# Patient Record
Sex: MALE | Race: BLACK OR AFRICAN AMERICAN | Employment: UNEMPLOYED | URBAN - METROPOLITAN AREA
[De-identification: names, ages, dates, MRNs, and addresses within clinical notes are randomized per-mention and may not be internally consistent; named-entity substitution may affect disease eponyms.]

---

## 2017-08-03 ENCOUNTER — HOSPITAL ENCOUNTER (EMERGENCY)
Age: 42
Discharge: HOME | End: 2017-08-03
Attending: EMERGENCY MEDICINE | Admitting: EMERGENCY MEDICINE
Payer: SELF-PAY

## 2017-08-03 VITALS
WEIGHT: 145 LBS | DIASTOLIC BLOOD PRESSURE: 87 MMHG | BODY MASS INDEX: 21.48 KG/M2 | TEMPERATURE: 98.2 F | OXYGEN SATURATION: 99 % | RESPIRATION RATE: 16 BRPM | SYSTOLIC BLOOD PRESSURE: 136 MMHG | HEIGHT: 69 IN | HEART RATE: 65 BPM

## 2017-08-03 DIAGNOSIS — K08.89 DENTALGIA: Primary | ICD-10-CM

## 2017-08-03 PROCEDURE — 74011000250 HC RX REV CODE- 250: Performed by: PHYSICIAN ASSISTANT

## 2017-08-03 PROCEDURE — 99283 EMERGENCY DEPT VISIT LOW MDM: CPT

## 2017-08-03 PROCEDURE — 74011250637 HC RX REV CODE- 250/637: Performed by: PHYSICIAN ASSISTANT

## 2017-08-03 RX ORDER — NAPROXEN 500 MG/1
500 TABLET ORAL
Qty: 20 TAB | Refills: 0 | Status: SHIPPED | OUTPATIENT
Start: 2017-08-03

## 2017-08-03 RX ORDER — TRAMADOL HYDROCHLORIDE 50 MG/1
50 TABLET ORAL
Status: COMPLETED | OUTPATIENT
Start: 2017-08-03 | End: 2017-08-03

## 2017-08-03 RX ORDER — PENICILLIN V POTASSIUM 500 MG/1
500 TABLET, FILM COATED ORAL 4 TIMES DAILY
Qty: 28 TAB | Refills: 0 | Status: SHIPPED | OUTPATIENT
Start: 2017-08-03 | End: 2017-08-10

## 2017-08-03 RX ADMIN — LIDOCAINE HYDROCHLORIDE: 20 SOLUTION ORAL; TOPICAL at 09:22

## 2017-08-03 RX ADMIN — TRAMADOL HYDROCHLORIDE 50 MG: 50 TABLET, COATED ORAL at 09:22

## 2017-08-03 NOTE — DISCHARGE INSTRUCTIONS
Periodontal Conditions: Care Instructions  Your Care Instructions    Periodontal conditions affect the gums, bone, and tissue that surround and support the teeth. The most common problems are caused by plaque. Plaque is a thin film of bacteria that sticks to teeth above and below the gum line. It can build up and harden into tartar. The bacteria in plaque and tartar can cause gum disease. Gingivitis is a disease that affects the gums (gingiva). The gums are the soft tissue that surrounds the teeth. Gingivitis causes red, swollen, tender gums that bleed easily when brushed, persistent bad breath, and sensitive teeth. Because it is not painful, many people do not get treatment when they should. Gingivitis can be reversed with good dental care. Periodontitis is a more advanced disease that affects more than the gums. The gums pull away from the teeth. This leaves deep pockets where bacteria can grow. The disease can damage the bones that support the teeth. The teeth may get loose and fall out. A periodontal condition should be treated as soon as it is found. Finding gum problems early, treating them right away, and having regular checkups bring the best results. You can treat mild periodontal conditions by brushing and flossing your teeth every day. Your dentist may prescribe a mouthwash to kill the bacteria that can damage teeth and gums. Your dentist may have you take antibiotics to treat infection from moderate periodontal disease. If your gums have pulled away from your teeth, you may need cleaning between the teeth and gums right down to the teeth roots. This is called root planing and scaling. If you have severe periodontal disease, you may need surgery to remove diseased gum tissue or repair bone damage. Follow-up care is a key part of your treatment and safety. Be sure to make and go to all appointments, and call your dentist if you are having problems.  It's also a good idea to know your test results and keep a list of the medicines you take. How can you care for yourself at home? · If your dentist prescribed antibiotics, take them as directed. Do not stop taking them just because you feel better. You need to take the full course of antibiotics. · Brush your teeth twice a day, in the morning and at night. ¨ Use a toothbrush with soft, rounded-end bristles and a head that is small enough to reach all parts of your teeth and mouth. Replace your toothbrush every 3 to 4 months. ¨ Use a fluoride toothpaste. ¨ Place the brush at a 45-degree angle where the teeth meet the gums. Press firmly, and gently rock the brush back and forth using small circular movements. ¨ Brush chewing surfaces vigorously with short back-and-forth strokes. ¨ Brush your tongue from back to front. · Floss at least once a day. Choose the type and flavor that you like best.  · Have your teeth cleaned by a professional at least twice a year. · Ask your dentist about using an antibacterial mouthwash to help reduce bacteria. · Rinse your mouth with water or chew sugar-free gum after meals if you can't brush your teeth. · Do not smoke or use smokeless tobacco. Tobacco use can cause periodontal disease. When should you call for help? Call your dentist now or seek immediate medical care if:  · You have symptoms of infection, such as:  ¨ Increased pain, swelling, warmth, or redness. ¨ Red streaks leading from the area. ¨ Pus draining from the area. ¨ A fever. Watch closely for changes in your health, and be sure to contact your dentist if:  · You have new or worse tooth pain. · You do not get better as expected. Where can you learn more? Go to http://eduardo-abel.info/. Enter H558 in the search box to learn more about \"Periodontal Conditions: Care Instructions. \"  Current as of: January 6, 2017  Content Version: 11.3  © 8731-7392 [employer].  Care instructions adapted under license by [employer] (which disclaims liability or warranty for this information). If you have questions about a medical condition or this instruction, always ask your healthcare professional. [address] any warranty or liability for your use of this information.

## 2017-08-03 NOTE — ED NOTES
Discharge instructions reviewed with patient. Patient able to verbalize events which would require immediate follow up.

## 2017-08-03 NOTE — ED PROVIDER NOTES
Patient is a 40 y.o. male presenting with dental problem. The history is provided by the patient. Dental Pain    This is a recurrent problem. The current episode started 2 days ago. The problem occurs constantly. The problem has not changed since onset. The pain is located in the right lower and right upper mouth. The quality of the pain is aching. The pain is at a severity of 10/10. The pain is moderate. Associated symptoms include vomiting. There was no nausea, no fever, no swelling, no chest pain, no shortness of breath, no headaches, no gum redness and no drainage. He has tried acetaminophen for the symptoms. The treatment provided mild relief. History reviewed. No pertinent past medical history. History reviewed. No pertinent surgical history. History reviewed. No pertinent family history. Social History     Social History    Marital status: SINGLE     Spouse name: N/A    Number of children: N/A    Years of education: N/A     Occupational History    Not on file. Social History Main Topics    Smoking status: Former Smoker    Smokeless tobacco: Never Used    Alcohol use Yes    Drug use: No    Sexual activity: Not on file     Other Topics Concern    Not on file     Social History Narrative         ALLERGIES: Review of patient's allergies indicates no known allergies. Review of Systems   Constitutional: Negative for activity change, appetite change, chills, fatigue and fever. HENT: Positive for dental problem. Negative for congestion, drooling, ear pain, facial swelling, mouth sores, postnasal drip, rhinorrhea and sore throat. Eyes: Negative for pain and discharge. Respiratory: Negative. Negative for apnea, cough and shortness of breath. Cardiovascular: Negative. Negative for chest pain. Gastrointestinal: Negative. Negative for abdominal pain, constipation, diarrhea, nausea and vomiting. Musculoskeletal: Negative. Skin: Negative.   Negative for color change and rash.   Neurological: Negative. Negative for light-headedness and headaches. Psychiatric/Behavioral: Negative. Vitals:    08/03/17 0823 08/03/17 0851   BP: (!) 134/93 136/87   Pulse: 65    Resp: 16    Temp: 98.2 °F (36.8 °C)    SpO2: 99%    Weight: 65.8 kg (145 lb)    Height: 5' 9\" (1.753 m)             Physical Exam   Constitutional: He is oriented to person, place, and time. He appears well-developed and well-nourished. No distress. HENT:   Head: Normocephalic and atraumatic. Right Ear: Hearing, tympanic membrane, external ear and ear canal normal.   Left Ear: Hearing, tympanic membrane, external ear and ear canal normal.   Nose: Nose normal. No mucosal edema or rhinorrhea. Right sinus exhibits no maxillary sinus tenderness and no frontal sinus tenderness. Left sinus exhibits no maxillary sinus tenderness and no frontal sinus tenderness. Mouth/Throat: Uvula is midline, oropharynx is clear and moist and mucous membranes are normal. No oral lesions. No trismus in the jaw. Abnormal dentition. Dental caries present. No dental abscesses or uvula swelling. No oropharyngeal exudate. Eyes: Conjunctivae and EOM are normal. Pupils are equal, round, and reactive to light. Neck: Normal range of motion. Neck supple. Pulmonary/Chest: Effort normal. No accessory muscle usage. No respiratory distress. Neurological: He is alert and oriented to person, place, and time. No cranial nerve deficit. Skin: Skin is warm, dry and intact. He is not diaphoretic. No pallor. Psychiatric: He has a normal mood and affect. His speech is normal and behavior is normal. Judgment and thought content normal.   Nursing note and vitals reviewed. MDM  Number of Diagnoses or Management Options  Dentalgia:   Diagnosis management comments: DDx: dental caries, dentalgia, dental abscess, dental fx, malingering    LABORATORY TESTS:  No results found for this or any previous visit (from the past 12 hour(s)).     IMAGING RESULTS:  No orders to display    MEDICATIONS GIVEN:  Medications  dental ball (lidocaine/Benadryl/Cetacaine) mixture (not administered)  traMADol (ULTRAM) tablet 50 mg (not administered)    IMPRESSION:  Naye Gay  (primary encounter diagnosis)    PLAN:  1. Current Discharge Medication List    START taking these medications    penicillin v potassium (VEETID) 500 mg tablet  Take 1 Tab by mouth four (4) times daily for 7 days. Qty: 28 Tab Refills: 0    naproxen (NAPROSYN) 500 mg tablet  Take 1 Tab by mouth two (2) times daily as needed. Qty: 20 Tab Refills: 0        2. Follow-up Information     Follow up With Details Comments [facility] Schedule an appointment as soon   as possible for a visit in 1 week As needed, If symptoms worsen [address] [place] [address] Drive  [phone]      Return to ED if worse                  Amount and/or Complexity of Data Reviewed  Tests in the medicine section of CPT®: ordered and reviewed    Patient Progress  Patient progress: stable    ED Course       Procedures    9:13 AM  I have discussed with patient their diagnosis, treatment, and follow up plan. The patient agrees to follow up as outlined in discharge paperwork and also to return to the ED with any worsening.  Paul Tamez PA-C

## 2024-07-03 ENCOUNTER — HOSPITAL ENCOUNTER (EMERGENCY)
Facility: HOSPITAL | Age: 49
Discharge: ANOTHER HOSPITAL | End: 2024-07-05
Attending: STUDENT IN AN ORGANIZED HEALTH CARE EDUCATION/TRAINING PROGRAM
Payer: MEDICAID

## 2024-07-03 DIAGNOSIS — F23 ACUTE PSYCHOSIS (HCC): Primary | ICD-10-CM

## 2024-07-03 LAB
ALBUMIN SERPL-MCNC: 3.8 G/DL (ref 3.5–5)
ALBUMIN/GLOB SERPL: 1 (ref 1.1–2.2)
ALP SERPL-CCNC: 72 U/L (ref 45–117)
ALT SERPL-CCNC: 37 U/L (ref 12–78)
AMPHET UR QL SCN: NEGATIVE
ANION GAP SERPL CALC-SCNC: 7 MMOL/L (ref 5–15)
APPEARANCE UR: CLEAR
AST SERPL-CCNC: 26 U/L (ref 15–37)
BACTERIA URNS QL MICRO: NEGATIVE /HPF
BARBITURATES UR QL SCN: NEGATIVE
BASOPHILS # BLD: 0 K/UL (ref 0–0.1)
BASOPHILS NFR BLD: 0 % (ref 0–1)
BENZODIAZ UR QL: NEGATIVE
BILIRUB SERPL-MCNC: 0.4 MG/DL (ref 0.2–1)
BILIRUB UR QL: NEGATIVE
BUN SERPL-MCNC: 6 MG/DL (ref 6–20)
BUN/CREAT SERPL: 9 (ref 12–20)
CALCIUM SERPL-MCNC: 10.2 MG/DL (ref 8.5–10.1)
CANNABINOIDS UR QL SCN: NEGATIVE
CHLORIDE SERPL-SCNC: 100 MMOL/L (ref 97–108)
CO2 SERPL-SCNC: 31 MMOL/L (ref 21–32)
COCAINE UR QL SCN: NEGATIVE
COLOR UR: ABNORMAL
CREAT SERPL-MCNC: 0.65 MG/DL (ref 0.7–1.3)
DIFFERENTIAL METHOD BLD: ABNORMAL
EOSINOPHIL # BLD: 0 K/UL (ref 0–0.4)
EOSINOPHIL NFR BLD: 0 % (ref 0–7)
EPITH CASTS URNS QL MICRO: ABNORMAL /LPF
ERYTHROCYTE [DISTWIDTH] IN BLOOD BY AUTOMATED COUNT: 13.7 % (ref 11.5–14.5)
ETHANOL SERPL-MCNC: <10 MG/DL (ref 0–0.08)
GLOBULIN SER CALC-MCNC: 4 G/DL (ref 2–4)
GLUCOSE SERPL-MCNC: 71 MG/DL (ref 65–100)
GLUCOSE UR STRIP.AUTO-MCNC: 500 MG/DL
HCT VFR BLD AUTO: 32.2 % (ref 36.6–50.3)
HGB BLD-MCNC: 10.3 G/DL (ref 12.1–17)
HGB UR QL STRIP: NEGATIVE
IMM GRANULOCYTES # BLD AUTO: 0 K/UL (ref 0–0.04)
IMM GRANULOCYTES NFR BLD AUTO: 0 % (ref 0–0.5)
KETONES UR QL STRIP.AUTO: NEGATIVE MG/DL
LEUKOCYTE ESTERASE UR QL STRIP.AUTO: NEGATIVE
LYMPHOCYTES # BLD: 1.6 K/UL (ref 0.8–3.5)
LYMPHOCYTES NFR BLD: 14 % (ref 12–49)
Lab: NORMAL
MCH RBC QN AUTO: 33.3 PG (ref 26–34)
MCHC RBC AUTO-ENTMCNC: 32 G/DL (ref 30–36.5)
MCV RBC AUTO: 104.2 FL (ref 80–99)
METHADONE UR QL: NEGATIVE
MONOCYTES # BLD: 1.2 K/UL (ref 0–1)
MONOCYTES NFR BLD: 11 % (ref 5–13)
NEUTS SEG # BLD: 8.1 K/UL (ref 1.8–8)
NEUTS SEG NFR BLD: 75 % (ref 32–75)
NITRITE UR QL STRIP.AUTO: NEGATIVE
NRBC # BLD: 0 K/UL (ref 0–0.01)
NRBC BLD-RTO: 0 PER 100 WBC
OPIATES UR QL: NEGATIVE
PCP UR QL: NEGATIVE
PH UR STRIP: 6.5 (ref 5–8)
PLATELET # BLD AUTO: 390 K/UL (ref 150–400)
PMV BLD AUTO: 8.3 FL (ref 8.9–12.9)
POTASSIUM SERPL-SCNC: 3.6 MMOL/L (ref 3.5–5.1)
PROT SERPL-MCNC: 7.8 G/DL (ref 6.4–8.2)
PROT UR STRIP-MCNC: NEGATIVE MG/DL
RBC # BLD AUTO: 3.09 M/UL (ref 4.1–5.7)
RBC #/AREA URNS HPF: ABNORMAL /HPF (ref 0–5)
SODIUM SERPL-SCNC: 138 MMOL/L (ref 136–145)
SP GR UR REFRACTOMETRY: <1.005
URINE CULTURE IF INDICATED: ABNORMAL
UROBILINOGEN UR QL STRIP.AUTO: 1 EU/DL (ref 0.2–1)
WBC # BLD AUTO: 11 K/UL (ref 4.1–11.1)
WBC URNS QL MICRO: ABNORMAL /HPF (ref 0–4)

## 2024-07-03 PROCEDURE — 85025 COMPLETE CBC W/AUTO DIFF WBC: CPT

## 2024-07-03 PROCEDURE — 36415 COLL VENOUS BLD VENIPUNCTURE: CPT

## 2024-07-03 PROCEDURE — 82077 ASSAY SPEC XCP UR&BREATH IA: CPT

## 2024-07-03 PROCEDURE — 80307 DRUG TEST PRSMV CHEM ANLYZR: CPT

## 2024-07-03 PROCEDURE — 99285 EMERGENCY DEPT VISIT HI MDM: CPT

## 2024-07-03 PROCEDURE — 80053 COMPREHEN METABOLIC PANEL: CPT

## 2024-07-03 PROCEDURE — 81001 URINALYSIS AUTO W/SCOPE: CPT

## 2024-07-03 RX ORDER — QUETIAPINE FUMARATE 25 MG/1
100 TABLET, FILM COATED ORAL ONCE
Status: COMPLETED | OUTPATIENT
Start: 2024-07-03 | End: 2024-07-04

## 2024-07-03 RX ORDER — LORAZEPAM 2 MG/ML
1 INJECTION INTRAMUSCULAR ONCE
Status: DISCONTINUED | OUTPATIENT
Start: 2024-07-03 | End: 2024-07-05 | Stop reason: HOSPADM

## 2024-07-03 RX ORDER — LORAZEPAM 2 MG/ML
1 INJECTION INTRAMUSCULAR
Status: DISCONTINUED | OUTPATIENT
Start: 2024-07-03 | End: 2024-07-03

## 2024-07-03 RX ORDER — 0.9 % SODIUM CHLORIDE 0.9 %
1000 INTRAVENOUS SOLUTION INTRAVENOUS ONCE
Status: DISCONTINUED | OUTPATIENT
Start: 2024-07-03 | End: 2024-07-03

## 2024-07-03 NOTE — ED NOTES
Tristan from [facility] stated that [facility] would see pt. [facility] arrived to see pt.
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
## 2024-07-03 NOTE — BSMART NOTE
Spoke with Sofya and Dustin with [facility] and they recommended a TDO. Charge RN and provider aware. Clinicals provided.     Tristan Porras LCSW

## 2024-07-03 NOTE — ED TRIAGE NOTES
Pt was brought in by Ems form his group home. Pt stated that he want to get away from group home saying \"they set me up\".
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
## 2024-07-03 NOTE — ED NOTES
Pt appears to be paranoid and reacting to external stimuli. Pt in room watching EMS staff believing they are talking about him.     Pt told that EMS staff was not talking to him but he appears to still believe otherwise.     Pt remains at door pacing and talking to self.

## 2024-07-03 NOTE — ED NOTES
Pt refused medications despite multiple attempts by multiple staff members. Pt states \"I don't need no meds, I wanna know what's going on\". This nurse explained to patient that we were waiting to hear from the behavioral health counselor to tell us how we can best help him. Pt stated \"okay, okay\" and sat back down on bed. Pt is now back up in room, standing at the door, and screaming at staff.

## 2024-07-03 NOTE — ED NOTES
This nurse called rehab facility from which the patient came and spoke with nurse supervisor, Khurram. 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 Pt reportedly was waiting for a bed on a psych unit but was sent to this rehab facility instead. Facility staff reports that they believe the pt was high this morning due to being hyperverbal but then appeared to have crashed later on in the day, and became seemingly depressed. Pt reportedly spoke with admin at facility and told them that he was upset because he felt like no one there cared about him. Pt was reportedly told to write down his feelings and he returned to his room to do so. Pt became irate while in his room and began slamming his head on the wall stating he wanted to kill himself. Staff reportedly called 911 due to patients escalation, but then called EMS not long after police arrived due to the patient requesting to speak with a behavioral health counselor. Rehab facility states that they have not refused taking the patient back if he were to be discharged today. Staff reports that the patient has never displayed this type of aggression prior to today.

## 2024-07-03 NOTE — ED NOTES
Pt refusing IV; Pt got up out of bed and ambulated to hallway where he requested to speak with a charge nurse. Provider aware

## 2024-07-03 NOTE — ED NOTES
Pt refused BSMART consult. BSMART spoke with pt for verification, pt still refused. Pt denies SI/HI.

## 2024-07-03 NOTE — ED PROVIDER NOTES
Pain Edu?       Excl. in GC?         Physical Exam  Vitals and nursing note reviewed.   Constitutional:       General: He is not in acute distress.     Appearance: He is not ill-appearing, toxic-appearing or diaphoretic.   HENT:      Head: Normocephalic and atraumatic.      Right Ear: Tympanic membrane and ear canal normal.      Left Ear: Tympanic membrane and ear canal normal.      Nose: Nose normal.      Mouth/Throat:      Mouth: Mucous membranes are moist.      Pharynx: Oropharynx is clear. No oropharyngeal exudate or posterior oropharyngeal erythema.   Eyes:      Extraocular Movements: Extraocular movements intact.      Conjunctiva/sclera: Conjunctivae normal.      Pupils: Pupils are equal, round, and reactive to light.   Cardiovascular:      Rate and Rhythm: Normal rate and regular rhythm.      Pulses: Normal pulses.      Heart sounds: Normal heart sounds.   Pulmonary:      Effort: Pulmonary effort is normal.      Breath sounds: Normal breath sounds.   Musculoskeletal:      Cervical back: Normal range of motion and neck supple. No tenderness.   Lymphadenopathy:      Cervical: No cervical adenopathy.   Skin:     Capillary Refill: Capillary refill takes less than 2 seconds.   Neurological:      Mental Status: He is alert and oriented to person, place, and time.   Psychiatric:         Attention and Perception: He perceives auditory and visual hallucinations.         Mood and Affect: Affect is flat.         Speech: Speech normal.         Behavior: Behavior is uncooperative and withdrawn.         Thought Content: Thought content is paranoid and delusional.          DIAGNOSTIC RESULTS   LABS:     No results found for this or any previous visit (from the past 24 hour(s)).      EKG: When ordered, EKG's are interpreted by the Emergency Department Physician in the absence of a cardiologist.  Please see their note for interpretation of EKG.      RADIOLOGY:  Non-plain film images such as CT, Ultrasound and MRI are read by

## 2024-07-03 NOTE — ED NOTES
Pt ambulating out of room stating he wants to know what's going on. Charge nurse escorted pt back to room

## 2024-07-03 NOTE — BSMART NOTE
BSMART assessment completed, and suicide risk level noted to be no risk. Primary Nurse Radha and Charge Nurse BROOKLYN and Physician Tammie Noguera NP notified. Concerns not observed.     Tristan Porras LCSW

## 2024-07-03 NOTE — ED NOTES
Patient standing at door stating: \"Man, What's up?\"     Pt is responding to staff walking by, believing they are talking about him. Pt asking \"Why y'all talking to me man? Man What's up? What's going on?\"     Updated patient that CTC needs to speak with patient. Pt states: \"Okay yo, I just want to talk to my girl.\"    Pt in next room is crying d/t IV placement and pt repeatedly, \"Why you crying? What's up?\"    PA placed PRN orders d/t patient behavior in room in case of further escalation.

## 2024-07-04 LAB
FLUAV RNA SPEC QL NAA+PROBE: NOT DETECTED
FLUBV RNA SPEC QL NAA+PROBE: NOT DETECTED
SARS-COV-2 RNA RESP QL NAA+PROBE: NOT DETECTED

## 2024-07-04 PROCEDURE — 6370000000 HC RX 637 (ALT 250 FOR IP): Performed by: STUDENT IN AN ORGANIZED HEALTH CARE EDUCATION/TRAINING PROGRAM

## 2024-07-04 PROCEDURE — 87636 SARSCOV2 & INF A&B AMP PRB: CPT

## 2024-07-04 RX ADMIN — QUETIAPINE FUMARATE 100 MG: 25 TABLET ORAL at 02:19

## 2024-07-04 ASSESSMENT — PAIN SCALES - GENERAL
PAINLEVEL_OUTOF10: 0
PAINLEVEL_OUTOF10: 0

## 2024-07-04 ASSESSMENT — PAIN - FUNCTIONAL ASSESSMENT: PAIN_FUNCTIONAL_ASSESSMENT: 0-10

## 2024-07-04 NOTE — ED NOTES
Pt still sleeping in NAD, respirations of a  normal depth, rate, and pattern. Officer remains at bedside.

## 2024-07-04 NOTE — ED NOTES
RN assumed care of patient at this time. Bedside/verbal report received from shivam rn at this time. Patient AAOx4 in sitting position.  Stretcher in lowest locked position and call bell within reach.
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
## 2024-07-04 NOTE — ED NOTES
Hourly rounding completed on this pt. Offered assistance for toileting or hygiene at this time. Provided opportunity for snack nourishment or PO fluid hydration. Pt is up-to-date on plan of care. No pain interventions required at this time. Pt sitting on trash can, no complaints of discomfort.

## 2024-07-04 NOTE — ED NOTES
Pt in room sitting on trash can. RN asked pt if there is anything he needs. Pt ignored RN. [employer] at bedside.

## 2024-07-04 NOTE — BSMART NOTE
This writer rounded on patient.  Patient agreed to talk with this writer and was informed of counselor's role.    The patient is wrapped in a hospital blanket but appears to demonstrate adequate hygiene.  The patient's behavior shows poor eye contact but seems eager to converse with this writer. The patient is oriented to time, place, person and situation.  The patient's speech is soft.  The patient's mood  is compliant but withdrawn.  The range of affect is constricted.  The patient's thought content  demonstrates some thought blocking.  The thought process shows evidence of responding to internal stimuli.  The patient's perception demonstrated changes in the following:  auditory  hallucinations, voices telling him to \"get my room mate.\". The patient's memory shows no evidence of impairment.  The patient's appetite shows no evidence of impairment.  The patient's sleep shows no evidence of impairment. The patient's insight is blaming.  The patient's judgement is psychologically impaired.  Patient denies suicidal and/or homicidal ideation.   The plan is for [facility] to continue bed search for TDO placement..

## 2024-07-04 NOTE — ED NOTES
Pt came out of room and sat at nurses station. Pt mumbling to himself. This RN redirected patient back to room with apple juice. Pt back in room sitting quietly. Still waiting on [employer] to execute TDO.

## 2024-07-04 NOTE — ED NOTES
RN provided pepsi to pt and asked him if he wanted something to help him sleep. Pt agrees. RN informed Dr. Clark.

## 2024-07-04 NOTE — ED NOTES
Pt walked out ED room, security redirected pt back into room. Pt sitting on trash can. Multiple attempts to have pt sit in the bed, pt still sitting on trash can. Pt denies dizziness, chest pain, and SOB. Pt finish drinking his juice.

## 2024-07-04 NOTE — ED NOTES
Contacted [facility] for update on patient. Stated nobody has looked at patient and will continue bed search in the morning.

## 2024-07-04 NOTE — ED NOTES
Pt moved from sitting on trash can to bed. RN provided pt with orange juice and warm blanket. Pt agrees to taking Seroquel.

## 2024-07-05 VITALS
OXYGEN SATURATION: 99 % | DIASTOLIC BLOOD PRESSURE: 76 MMHG | WEIGHT: 119.2 LBS | BODY MASS INDEX: 17.66 KG/M2 | TEMPERATURE: 98 F | RESPIRATION RATE: 18 BRPM | HEART RATE: 94 BPM | SYSTOLIC BLOOD PRESSURE: 107 MMHG | HEIGHT: 69 IN

## 2024-07-05 NOTE — PROGRESS NOTES
[facility] Admission Pharmacy Medication Reconciliation IN PROGRESS    Information obtained from:  Rx  Query  RxQuery data available1: yes from April 2024    Comments/recommendations:    1) The patient interview has NOT yet been completed.     2) NOTES:  Patient has history of these medications - compliance has not been confirmed  Filled 4/17 escitalopram 5 mg #30 (30-day supply)  Filled 4/17 mirtazapine 15 mg #30 (30-day supply)  Filled 4/17 fluticasone nasal spray    3) The [place] Prescription Monitoring Program () was accessed to determine fill history of any controlled medications:  No record of controlled medications dispensed in past 2 years       1RxQuery pharmacy benefit data reflects medications filled and processed through the patient's insurance, however                this data does NOT capture whether the medication was picked up or is currently being taken by the patient.       Past Medical History/Disease States:  No past medical history on file.      Patient allergies:   Allergies as of 07/03/2024    (No Known Allergies)       Prior to admission medications  In progress      Thank you,  THO GILLIAM [facility]

## 2024-07-05 NOTE — ED NOTES
Pt woke up. Gown and linens changed. Pt offered food and water. Pt calm and cooperative. Pt went back to sleep.

## 2024-07-05 NOTE — ED NOTES
TRANSFER - OUT REPORT:    Verbal report given to Sara TRENT on Andrea Emanuel  being transferred to [facility] for routine progression of patient care       Report consisted of patient's Situation, Background, Assessment and   Recommendations(SBAR).     Information from the following report(s) Nurse Handoff Report, Index, ED Encounter Summary, ED SBAR, Adult Overview, Surgery Report, Intake/Output, MAR, Recent Results, Neuro Assessment, and Event Log was reviewed with the receiving nurse.    [place] Fall Assessment:    Presents to emergency department  because of falls (Syncope, seizure, or loss of consciousness): No  Age > 70: No  Altered Mental Status, Intoxication with alcohol or substance confusion (Disorientation, impaired judgment, poor safety awaremess, or inability to follow instructions): No  Impaired Mobility: Ambulates or transfers with assistive devices or assistance; Unable to ambulate or transer.: No  Nursing Judgement: No          Lines:       Opportunity for questions and clarification was provided.      Patient transported with:

## 2024-07-05 NOTE — ED NOTES
Patient resting comfortably with eyes closed, lying on stretcher. Patient ate 75% of his breakfast tray.

## 2024-07-06 NOTE — CONSULTS
[facility]               [address] [place] [address]  [id number]                              PSYCH CONSULT      PATIENT NAME: JUSTIN VO     : 1975  MED REC NO: [id number]                       ROOM: [facility]  ACCOUNT NO: [id number]                       ADMIT DATE: 2024  PROVIDER: Alen Rueda MD    DATE:  2024    HISTORY OF PRESENT ILLNESS:  Thank you for the opportunity to participate in the care of this patient.  We were consulted because of a prolonged stay in the emergency department with a psychiatric complaint.  However, the patient was nonverbal.  He would not make eye contact or respond to questions or inquiries. Based on documentation, he presented with confusion and altered mental status.  He apparently does not have an extensive psychiatric history was documented by the initial .  There were no reports of suicidal thoughts or homicidal ideation.  There was some presumably delusional content noted on the provider's notes.    MENTAL STATUS EXAMINATION:  The patient is responding to internal stimuli.  I am not able to assess his thought process.  No spontaneous speech.  His cognitive function, memory, etc., are not able to be assessed due to inability and lack of cooperation.    TREATMENT RECOMMENDATIONS:  Presently, the patient appeared to completely lack of capacity to consent to treatments if a psychiatric bed is recommend seeking a [facility] order, if this has not already been done.  Otherwise, we may consider antipsychotic such as risperidone 1 mg twice a day, may also consider Ativan 0.5 mg 3 times a day for catatonic symptoms.  Otherwise, please consult the transfer centers for assistance with .    Thank you for consultation.        MD LORENA BAGLEY/DEION  D:  2024 15:44:18  T:  2024 16:35:28  JOB #:  [id number]

## 2024-07-06 NOTE — ED NOTES
Per [facility], [facility] is going to be coming to transport patient and bring TDO. [facility] asks for a call back if they dont arrive in 45min.

## 2024-07-06 NOTE — ED NOTES
[facility] signed, pt transported by wheelchair to [facility] car.  Pt informed on transport and en route. Pt A&O x 4, pt RR even and unlabored, pt chest rise and fall symmetrical.